# Patient Record
Sex: FEMALE | Race: BLACK OR AFRICAN AMERICAN | Employment: FULL TIME | URBAN - METROPOLITAN AREA
[De-identification: names, ages, dates, MRNs, and addresses within clinical notes are randomized per-mention and may not be internally consistent; named-entity substitution may affect disease eponyms.]

---

## 2020-06-29 ENCOUNTER — APPOINTMENT (OUTPATIENT)
Dept: PHYSICAL THERAPY | Age: 65
End: 2020-06-29

## 2020-06-30 ENCOUNTER — HOSPITAL ENCOUNTER (OUTPATIENT)
Dept: PHYSICAL THERAPY | Age: 65
Discharge: HOME | End: 2020-06-30
Payer: COMMERCIAL

## 2020-06-30 PROCEDURE — 97162 PT EVAL MOD COMPLEX 30 MIN: CPT

## 2020-06-30 PROCEDURE — 97110 THERAPEUTIC EXERCISES: CPT

## 2020-06-30 NOTE — PROGRESS NOTES
PT DAILY TREATMENT NOTE 10-18    Patient Name: Giselle Brito  Date:2020  : 1955  [x]  Patient  Verified  Payor: BLUE CROSS / Plan: [facility] PPO / Product Type: PPO /    In time:8:04  Out time:8:45  Total Treatment Time (min): 41  Visit #: 1 of 8    Medicare/BCBS Only   Total Timed Codes (min):  15 1:1 Treatment Time:  15       Treatment Area: Spinal stenosis, lumbar region without neurogenic claudication [M48.061]    SUBJECTIVE  Pain Level (0-10 scale): 4/10  Any medication changes, allergies to medications, adverse drug reactions, diagnosis change, or new procedure performed?: [x] No    [] Yes (see summary sheet for update)  Subjective functional status/changes:   [] No changes reported  The patient states that she has a chief complaint of low back pain limiting her ease of sleeping as well as performing chores and walking. She does report that she experiences referral into her lower extremities, but mainly at night. OBJECTIVE  26 min [x]Eval                  []Re-Eval       15 min Therapeutic Exercise:  [] See flow sheet :   Rationale: increase ROM and increase strength to improve the patients ability to improve ADL ease. With   [] TE   [] TA   [] neuro   [] other: Patient Education: [x] Review HEP    [] Progressed/Changed HEP based on:   [] positioning   [] body mechanics   [] transfers   [] heat/ice application    [] other:      Other Objective/Functional Measures: See IE     Pain Level (0-10 scale) post treatment: 4/10    ASSESSMENT/Changes in Function: See POC.      Patient will continue to benefit from skilled PT services to modify and progress therapeutic interventions, address functional mobility deficits, address ROM deficits, address strength deficits, analyze and address soft tissue restrictions, analyze and cue movement patterns, analyze and modify body mechanics/ergonomics, assess and modify postural abnormalities and instruct in home and community integration to attain remaining goals. [x]  See Plan of Care  []  See progress note/recertification  []  See Discharge Summary         Progress towards goals / Updated goals:  Short Term Goals: To be accomplished in 2 weeks:               1. The patient will be independent and compliant with HEP to maximize therapeutic benefit. IE: Issued HEP. 2. The patient will demonstrate negative 90/90 hamstring flexibility to test to improve limit lumbar requirements of flexion upon forward flexion   IE: Positive 90/90 test  Long Term Goals: To be accomplished in 4 weeks:               1. The patient will improve FOTO score to 51 to maximize quality of life. IE: 55               2. The patient will demonstrate negative alexander testing/Ely test to reduce stress and lordotic tilt during ambulation. IE: Positive Ely/Alexander test               3. The patient will demonstrate hip ABD/EXT to 4+/5 MMT to maximize ease of chore production. IE: 4/5 MMT glute med/max               4. The patient will report being able to stand for > 30 minutes without increase in low back pain.    IE: reports pain after 15 minutes    PLAN  []  Upgrade activities as tolerated     [x]  Continue plan of care  []  Update interventions per flow sheet       []  Discharge due to:_  []  Other:_      Татьяна Malin, PT 6/30/2020  9:27 AM    Future Appointments   Date Time Provider Janice Cavazos   7/6/2020  8:30 AM Philip Baer, PT [facility]   7/8/2020  7:15 AM Fontanez Pals, PTA [facility]   7/15/2020  7:15 AM Fontanez Pals, PTA [facility]   7/17/2020  7:15 AM Diogo Jimenez, PTA [facility]   7/22/2020  7:15 AM Fontanez Pals, PTA [facility]   7/24/2020  8:00 AM Diogo Jimenez, PTA [facility]   7/28/2020  9:30 AM Barbara Boss, PT [facility]

## 2020-06-30 NOTE — PROGRESS NOTES
[facility]  [address]  [place], [address]  [phone] fax    Plan of Care/ Statement of Necessity for Physical Therapy Services    Patient name: Cee Vega Start of Care: 2020   Referral source: KALEB Mckee : 1955    Medical Diagnosis: Spinal stenosis, lumbar region without neurogenic claudication [M48.061]  Payor: BLUE CROSS / Plan: [facility] PPO / Product Type: PPO /  Onset Date:2019    Treatment Diagnosis: LBP with B LE pain   Prior Hospitalization: see medical history Provider#: [id number]   Medications: Verified on Patient summary List    Comorbidities: HTN, arthritis, BMI > 30   Prior Level of Function: The patient states that she had improved ease of walking, sleeping, and getting up from a chair without significant pain prior to onset. The Plan of Care and following information is based on the information from the initial evaluation. Assessment/ key information: The patient is a 60year old female that has a chief complaint of low back pain and referral into her inner thighs which does extend to her big toes. She reports an onset of 2019 which was insidious and reports that the pain into her legs is not as bad, but happens in short bursts throughout the day or at night, while her back pain remains with her rather constantly aching. She presents with fairly good mobility with exception of extension and rotation. Her strength is rather limited with regards to her core and extensors. She is quite limited regarding flexibility about the hip with positive 90/90 HS testing, Ely, tiffany, and piriformis testing. The patient has impairments related to the diagnosis consisting of pain, decreased ROM, decreased flexibility, decreased strength, decreased ambulation efficiency, and decreased ease of sleeping.  The patient will benefit from skilled PT in order to address the aforementioned impairments. Evaluation Complexity History MEDIUM  Complexity : 1-2 comorbidities / personal factors will impact the outcome/ POC ; Examination MEDIUM Complexity : 3 Standardized tests and measures addressing body structure, function, activity limitation and / or participation in recreation  ;Presentation MEDIUM Complexity : Evolving with changing characteristics  ; Clinical Decision Making MEDIUM Complexity : FOTO score of 26-74  Overall Complexity Rating: MEDIUM  Problem List: pain affecting function, decrease ROM, decrease strength, decrease ADL/ functional abilitiies, decrease activity tolerance and decrease flexibility/ joint mobility   Treatment Plan may include any combination of the following: Therapeutic exercise, Therapeutic activities, Neuromuscular re-education, Physical agent/modality, Manual therapy, Patient education and Functional mobility training  Patient / Family readiness to learn indicated by: asking questions, trying to perform skills and interest  Persons(s) to be included in education: patient (P)  Barriers to Learning/Limitations: None  Patient Goal (s): Issue w/ pain will be resolved (pain in back + legs)  Patient Self Reported Health Status: good  Rehabilitation Potential: good    Short Term Goals: To be accomplished in 2 weeks:   1. The patient will be independent and compliant with HEP to maximize therapeutic benefit. 2. The patient will demonstrate negative 90/90 hamstring flexibility to test to improve limit lumbar requirements of flexion upon forward flexion  Long Term Goals: To be accomplished in 4 weeks:   1. The patient will improve FOTO score to 51 to maximize quality of life. 2. The patient will demonstrate negative tiffany testing/Ely test to reduce stress and lordotic tilt during ambulation. 3. The patient will demonstrate hip ABD/EXT to 4+/5 MMT to maximize ease of chore production.    4. The patient will report being able to stand for > 30 minutes without increase in low back pain. Frequency / Duration: Patient to be seen 2 times per week for 4 weeks. Patient/ Caregiver education and instruction: Diagnosis, prognosis, self care, activity modification and exercises   [x]  Plan of care has been reviewed with JOHNNIE Sevilla, PT 6/30/2020 9:19 AM    ________________________________________________________________________    I certify that the above Therapy Services are being furnished while the patient is under my care. I agree with the treatment plan and certify that this therapy is necessary.     Physician's Signature:____________Date:_________TIME:________    ** Signature, Date and Time must be completed for valid certification **    Please sign and return to [facility] [address]  [address] [facility] [address]  [place], [facility] [address]  [phone] fax

## 2020-07-06 ENCOUNTER — HOSPITAL ENCOUNTER (OUTPATIENT)
Dept: PHYSICAL THERAPY | Age: 65
Discharge: HOME | End: 2020-07-06
Payer: COMMERCIAL

## 2020-07-06 PROCEDURE — 97110 THERAPEUTIC EXERCISES: CPT

## 2020-07-06 PROCEDURE — 97112 NEUROMUSCULAR REEDUCATION: CPT

## 2020-07-06 NOTE — PROGRESS NOTES
PT DAILY TREATMENT NOTE 10-18    Patient Name: Cee Bradshaw  Date:2020  : 1955  [x]  Patient  Verified  Payor: BLUE CROSS / Plan: [facility] PPO / Product Type: PPO /    In CFVC:[id number]  Out time:919  Total Treatment Time (min): 44  Visit #: 2 of 8    Medicare/BCBS Only   Total Timed Codes (min):  44 1:1 Treatment Time:  44       Treatment Area: Spinal stenosis, lumbar region without neurogenic claudication [M48.061]    SUBJECTIVE  Pain Level (0-10 scale): 4  Any medication changes, allergies to medications, adverse drug reactions, diagnosis change, or new procedure performed?: [x] No    [] Yes (see summary sheet for update)  Subjective functional status/changes:   [] No changes reported  The pt reports her back is aching.      OBJECTIVE    Modality rationale:    Min Type Additional Details    [] Estim:  []Unatt       []IFC  []Premod                        []Other:  []w/ice   []w/heat  Position:  Location:    [] Estim: []Att    []TENS instruct  []NMES                    []Other:  []w/US   []w/ice   []w/heat  Position:  Location:    []  Traction: [] Cervical       []Lumbar                       [] Prone          []Supine                       []Intermittent   []Continuous Lbs:  [] before manual  [] after manual    []  Ultrasound: []Continuous   [] Pulsed                           []1MHz   []3MHz W/cm2:  Location:    []  Iontophoresis with dexamethasone         Location: [] Take home patch   [] In clinic    []  Ice     []  heat  []  Ice massage  []  Laser   []  Anodyne Position:  Location:    []  Laser with stim  []  Other:  Position:  Location:    []  Vasopneumatic Device Pressure:       [] lo [] med [] hi   Temperature: [] lo [] med [] hi   [] Skin assessment post-treatment:  []intact []redness- no adverse reaction    []redness  adverse reaction:       26 min Therapeutic Exercise:  [x] See flow sheet :   Rationale: increase ROM and increase strength to improve the patients ability to perform ADL       8 min Neuromuscular Re-education:  [x]  See flow sheet : lumbar stabilization   Rationale: increase strength, improve coordination and increase proprioception  to improve the patients ability to perform functional activities. With   [] TE   [] TA   [] neuro   [] other: Patient Education: [x] Review HEP    [] Progressed/Changed HEP based on:   [] positioning   [] body mechanics   [] transfers   [] heat/ice application    [] other:      Other Objective/Functional Measures: initiated treatment per flow sheet / first f/u     Pain Level (0-10 scale) post treatment: 4    ASSESSMENT/Changes in Function: Good effort noted with treatment. Pt was challenged with 1/2 prone hip extension due c/o weakness. + cues needed at times with therex and for PPT. Patient will continue to benefit from skilled PT services to modify and progress therapeutic interventions, address functional mobility deficits, address ROM deficits, address strength deficits, analyze and address soft tissue restrictions, analyze and cue movement patterns and analyze and modify body mechanics/ergonomics to attain remaining goals. []  See Plan of Care  []  See progress note/recertification  []  See Discharge Summary         Progress towards goals / Updated goals:  Short Term Goals: To be accomplished in 2 weeks:               1. The patient will be independent and compliant with HEP to maximize therapeutic benefit. EVAL: issued HEP   Current: progressing on 7-6-20               2. The patient will demonstrate negative 90/90 hamstring flexibility to test to improve limit lumbar requirements of flexion upon forward flexion  Long Term Goals: To be accomplished in 4 weeks:               1. The patient will improve FOTO score to 51 to maximize quality of life.               2. The patient will demonstrate negative tiffany testing/Ely test to reduce stress and lordotic tilt during ambulation.               3.  The patient will demonstrate hip ABD/EXT to 4+/5 MMT to maximize ease of chore production.               4. The patient will report being able to stand for > 30 minutes without increase in low back pain.     PLAN  []  Upgrade activities as tolerated     [x]  Continue plan of care  []  Update interventions per flow sheet       []  Discharge due to:_  []  Other:_      Dion Hall, PT 7/6/2020  8:39 AM    Future Appointments   Date Time Provider Janice Cavazos   7/8/2020  7:15 AM Rose Piotr, PTA [facility]   7/15/2020  7:15 AM Rose Piotr, PTA [facility]   7/17/2020  7:15 AM Kristeen Brood, PTA [facility]   7/22/2020  7:15 AM Rose Piotr, PTA [facility]   7/24/2020  8:00 AM Kristeen Brood, PTA [facility]   7/28/2020  9:30 AM Bela Boss, PT [facility]

## 2020-07-08 ENCOUNTER — HOSPITAL ENCOUNTER (OUTPATIENT)
Dept: PHYSICAL THERAPY | Age: 65
Discharge: HOME | End: 2020-07-08
Payer: COMMERCIAL

## 2020-07-08 PROCEDURE — 97112 NEUROMUSCULAR REEDUCATION: CPT

## 2020-07-08 PROCEDURE — 97110 THERAPEUTIC EXERCISES: CPT

## 2020-07-08 NOTE — PROGRESS NOTES
PT DAILY TREATMENT NOTE 10-18    Patient Name: Hezzie Aschoff  Date:2020  : 1955  [x]  Patient  Verified  Payor: BLUE CROSS / Plan: [facility] PPO / Product Type: PPO /    In time:7:16  Out time:8:07  Total Treatment Time (min): 46  Visit #: 3 of 8    Medicare/BCBS Only   Total Timed Codes (min):  51 1:1 Treatment Time:  51       Treatment Area: Spinal stenosis, lumbar region without neurogenic claudication [M48.061]    SUBJECTIVE  Pain Level (0-10 scale): 3  Any medication changes, allergies to medications, adverse drug reactions, diagnosis change, or new procedure performed?: [x] No    [] Yes (see summary sheet for update)  Subjective functional status/changes:   [] No changes reported  Pt reports she has been doing her exercises and stretches at home and its helping to keep her pain down and loosen her up. OBJECTIVE    36 min Therapeutic Exercise:  [x] See flow sheet :   Rationale: increase ROM and increase strength to improve the patients ability to perform functional task with ease. 15 min Neuromuscular Re-education:  [x]  See flow sheet :   Rationale: increase strength and improve coordination  to improve the patients ability to tolerate ADL's. With   [] TE   [] TA   [] neuro   [] other: Patient Education: [x] Review HEP    [] Progressed/Changed HEP based on:   [] positioning   [] body mechanics   [] transfers   [] heat/ice application    [] other:      Other Objective/Functional Measures: increased reps to 1/2 prone hip extension to 2x5   1/2 prone DK- 2x5 ea  PPT- 10x3\"  SLR- 10x ea    Pain Level (0-10 scale) post treatment: 3    ASSESSMENT/Changes in Function:   Pt displays improvements with 1/2 prone hip extension this visit performing 2x5 reps with no complaints of increased pain but noting some muscle fatigue in the B knees. Pt had no c/o increased pain with progressed therex and was able to complete the exercises as prescribed.  Pt reports good relief with therex. Patient will continue to benefit from skilled PT services to modify and progress therapeutic interventions, address functional mobility deficits, address ROM deficits, address strength deficits, analyze and address soft tissue restrictions, analyze and cue movement patterns, analyze and modify body mechanics/ergonomics and assess and modify postural abnormalities to attain remaining goals. [x]  See Plan of Care  []  See progress note/recertification  []  See Discharge Summary         Progress towards goals / Updated goals:  Short Term Goals: To be accomplished in 2 weeks:               1. The patient will be independent and compliant with HEP to maximize therapeutic benefit. EVAL: issued HEP              Current: progressing on 7-6-20               2. The patient will demonstrate negative 90/90 hamstring flexibility to test to improve limit lumbar requirements of flexion upon forward flexion   Current: 7/8/20 no change positive 90/90 test  Long Term Goals: To be accomplished in 4 weeks:               1. The patient will improve FOTO score to 51 to maximize quality of life.               2. The patient will demonstrate negative tiffany testing/Ely test to reduce stress and lordotic tilt during ambulation.               3. The patient will demonstrate hip ABD/EXT to 4+/5 MMT to maximize ease of chore production.               4. The patient will report being able to stand for > 30 minutes without increase in low back pain.     PLAN  []  Upgrade activities as tolerated     [x]  Continue plan of care  []  Update interventions per flow sheet       []  Discharge due to:_  []  Other:_      Rola Shaver PTA 7/8/2020  7:02 AM    Future Appointments   Date Time Provider Janice Cavazos   7/8/2020  7:15 AM Huseyin Haley, PTA [facility]   7/15/2020  7:15 AM Huseyin Haley, PTA [facility]   7/17/2020  7:15 AM Diogo Jimenez [facility]   7/22/2020  7:15 AM Huey Boateng C, PTA [facility]   7/24/2020  8:00 AM Horacio Rodrigues, PTA [facility]   7/28/2020  9:30 AM Kevin Boss, PT [facility]

## 2020-07-15 ENCOUNTER — HOSPITAL ENCOUNTER (OUTPATIENT)
Dept: PHYSICAL THERAPY | Age: 65
Discharge: HOME | End: 2020-07-15
Payer: COMMERCIAL

## 2020-07-15 PROCEDURE — 97112 NEUROMUSCULAR REEDUCATION: CPT

## 2020-07-15 PROCEDURE — 97110 THERAPEUTIC EXERCISES: CPT

## 2020-07-15 NOTE — PROGRESS NOTES
PT DAILY TREATMENT NOTE 10-18    Patient Name: Isidra Osoiro  Date:7/15/2020  : 1955  [x]  Patient  Verified  Payor: BLUE CARYL / Plan: [id number] PPO / Product Type: PPO /    In time:7:18  Out time:8:11  Total Treatment Time (min): 48  Visit #: 4 of 8    Medicare/BCBS Only   Total Timed Codes (min):  53 1:1 Treatment Time:  48       Treatment Area: Spinal stenosis, lumbar region without neurogenic claudication [M48.061]    SUBJECTIVE  Pain Level (0-10 scale): 3  Any medication changes, allergies to medications, adverse drug reactions, diagnosis change, or new procedure performed?: [x] No    [] Yes (see summary sheet for update)  Subjective functional status/changes:   [] No changes reported  Pt reports she is still feeling that constant nag in her low back and she feels the pain a lot in her low back when she starts to get tired. OBJECTIVE      38 min Therapeutic Exercise:  [x] See flow sheet :   Rationale: increase ROM and increase strength to improve the patients ability to perform functional task with ease. 15 min Neuromuscular Re-education:  [x]  See flow sheet :    Rationale: increase strength, improve coordination and increase proprioception  to improve the patients ability to perform ADL's with ease. With   [] TE   [] TA   [] neuro   [] other: Patient Education: [x] Review HEP    [] Progressed/Changed HEP based on:   [] positioning   [] body mechanics   [] transfers   [] heat/ice application    [] other:      Other Objective/Functional Measures:   Clamshells- 10x5\"   Assisted adductor stretch- 30\" ea    Pain Level (0-10 scale) post treatment: 1    ASSESSMENT/Changes in Function:   Pt reports she is continuing to get sharp burst of pain in her B inner thighs that keep her up at night and now she is starting to feel the same pain in her B UE's. Clamshells added to continue to strengthen the B hips/glutes to aid with L/S stability.  Pt continues to display fatigue with therex noting increased fatigue in her B knees but was able to perform therex as prescribed. Pt reports decreased pain post treatment. Patient will continue to benefit from skilled PT services to modify and progress therapeutic interventions, address functional mobility deficits, address ROM deficits, address strength deficits, analyze and address soft tissue restrictions, analyze and cue movement patterns, analyze and modify body mechanics/ergonomics and assess and modify postural abnormalities to attain remaining goals. [x]  See Plan of Care  []  See progress note/recertification   []  See Discharge Summary         Progress towards goals / Updated goals:  Short Term Goals: To be accomplished in 2 weeks:               1. The patient will be independent and compliant with HEP to maximize therapeutic benefit.              EVAL: issued HEP              [id number]: progressing on 7-6-20               2. The patient will demonstrate negative 90/90 hamstring flexibility to test to improve limit lumbar requirements of flexion upon forward flexion              Current: 7/8/20 no change positive 90/90 test  Long Term Goals: To be accomplished in 4 weeks:               1. The patient will improve FOTO score to 51 to maximize quality of life.               2. The patient will demonstrate negative tiffany testing/Ely test to reduce stress and lordotic tilt during ambulation.               3. The patient will demonstrate hip ABD/EXT to 4+/5 MMT to maximize ease of chore production.               4. The patient will report being able to stand for > 30 minutes without increase in low back pain.     PLAN  []  Upgrade activities as tolerated     [x]  Continue plan of care  []  Update interventions per flow sheet       []  Discharge due to:_  []  Other:_      Barbara Desai PTA 7/15/2020  7:03 AM    Future Appointments   Date Time Provider Janice Cavazos   7/15/2020  7:15 AM Jimbo Pendleton PTA [facility] 7/17/2020  7:15 AM Dia Quispe, PTA [facility]   7/22/2020  7:15 AM Jimenez Howell, PTA [facility]   7/24/2020  8:00 AM Dia Quispe, PTA [facility]   7/28/2020  9:30 AM Marquez Boss, PT [facility]

## 2020-07-17 ENCOUNTER — HOSPITAL ENCOUNTER (OUTPATIENT)
Dept: PHYSICAL THERAPY | Age: 65
Discharge: HOME | End: 2020-07-17
Payer: COMMERCIAL

## 2020-07-17 PROCEDURE — 97110 THERAPEUTIC EXERCISES: CPT

## 2020-07-17 PROCEDURE — 97112 NEUROMUSCULAR REEDUCATION: CPT

## 2020-07-17 NOTE — PROGRESS NOTES
PT DAILY TREATMENT NOTE 10-18    Patient Name: Rell Solorio  Date:2020  : 1955  [x]  Patient  Verified  Payor: BLUE CROSS / Plan: [facility] PPO / Product Type: PPO /    In time:8:13  Out time:9:07  Total Treatment Time (min): 47  Visit #: 5 of 8    Medicare/BCBS Only   Total Timed Codes (min):  44 1:1 Treatment Time:  44       Treatment Area: Spinal stenosis, lumbar region without neurogenic claudication [M48.061]    SUBJECTIVE  Pain Level (0-10 scale): 3/10  Any medication changes, allergies to medications, adverse drug reactions, diagnosis change, or new procedure performed?: [x] No    [] Yes (see summary sheet for update)  Subjective functional status/changes:   [] No changes reported  The patient states that her left knee and low back are bothersome upon arrival today. The patient continues to report referral into her thighs and legs at [facility]. OBJECTIVE  32 min Therapeutic Exercise:  [x] See flow sheet :   Rationale: increase ROM and increase strength to improve the patients ability to improve ADL ease. 12 min Neuromuscular Re-education:  [x]  See flow sheet :   Rationale: increase ROM and increase strength  to improve the patients ability to improve ADL ease. Modality rationale: decrease pain and increase tissue extensibility to improve the patients ability to improve ADL ease.     Min Type Additional Details   10 []  Ice     [x]  heat  []  Ice massage  []  Laser   []  Anodyne Position: seated  Location: lumbar spine   [] Skin assessment post-treatment:  []intact []redness- no adverse reaction    []redness  adverse reaction:           With   [] TE   [] TA   [] neuro   [] other: Patient Education: [x] Review HEP    [] Progressed/Changed HEP based on:   [] positioning   [] body mechanics   [] transfers   [] heat/ice application    [] other:      Other Objective/Functional Measures:   Alexander/Ely testing bilaterally: Significant improvement in Darlyn Schlatter test. Slight positive. Pain Level (0-10 scale) post treatment: 0/10    ASSESSMENT/Changes in Function: The patient is improving with regards to her anterior flexibility. She demonstrates continued pain regarding her low back and no significant change regarding referral into her legs at night. Good pain control post session, with the patient denying pain upon departure. Patient will continue to benefit from skilled PT services to modify and progress therapeutic interventions, address functional mobility deficits, address ROM deficits, address strength deficits, analyze and address soft tissue restrictions, analyze and cue movement patterns, analyze and modify body mechanics/ergonomics, assess and modify postural abnormalities and instruct in home and community integration to attain remaining goals. []  See Plan of Care  []  See progress note/recertification  []  See Discharge Summary         Progress towards goals / Updated goals:  Short Term Goals: To be accomplished in 2 weeks:               1. The patient will be independent and compliant with HEP to maximize therapeutic benefit.              EVAL: issued HEP              VAKRPRG: progressing on 7-6-20               2. The patient will demonstrate negative 90/90 hamstring flexibility to test to improve limit lumbar requirements of flexion upon forward flexion   IE: positive 90/90              Current: 7/8/20 no change positive 90/90 test  Long Term Goals: To be accomplished in 4 weeks:               1. The patient will improve FOTO score to 51 to maximize quality of life. IE: 55               2. The patient will demonstrate negative tiffany testing/Ely test to reduce stress and lordotic tilt during ambulation. IE: positive tiffany/Ely               3. The patient will demonstrate hip ABD/EXT to 4+/5 MMT to maximize ease of chore production. IE: between 3+ and 4/5 MMT               4.  The patient will report being able to stand for > 30 minutes without increase in low back pain.    IE: 30 minutes    PLAN  []  Upgrade activities as tolerated     [x]  Continue plan of care  []  Update interventions per flow sheet       []  Discharge due to:_  []  Other:_      Chris Renee, CHAPARRO 7/17/2020  8:18 AM    Future Appointments   Date Time Provider Janice Cavazos   7/22/2020  7:15 AM Rose Saldaña PTA [facility]   7/24/2020  8:00 AM [facility]oso [facility]   7/28/2020  9:30 AM Bela Boss, PT [facility]

## 2020-07-22 ENCOUNTER — HOSPITAL ENCOUNTER (OUTPATIENT)
Dept: PHYSICAL THERAPY | Age: 65
Discharge: HOME | End: 2020-07-22
Payer: COMMERCIAL

## 2020-07-22 PROCEDURE — 97112 NEUROMUSCULAR REEDUCATION: CPT

## 2020-07-22 PROCEDURE — 97110 THERAPEUTIC EXERCISES: CPT

## 2020-07-22 NOTE — PROGRESS NOTES
PT DAILY TREATMENT NOTE 10-18    Patient Name: Francoise Lopez  Date:2020  : 1955  [x]  Patient  Verified  Payor: BLUE CROSS / Plan: [id number] PPO / Product Type: PPO /    In time:7:15  Out time:8:15  Total Treatment Time (min): 60  Visit #: 6 of 8    Medicare/BCBS Only   Total Timed Codes (min):  50 1:1 Treatment Time:  50       Treatment Area: Spinal stenosis, lumbar region without neurogenic claudication [M48.061]    SUBJECTIVE  Pain Level (0-10 scale): 3  Any medication changes, allergies to medications, adverse drug reactions, diagnosis change, or new procedure performed?: [x] No    [] Yes (see summary sheet for update)  Subjective functional status/changes:   [] No changes reported  Pt reports her low back and B legs are aching this morning and they have been aching for the past couple of days. OBJECTIVE    Modality rationale: decrease pain and increase tissue extensibility to improve the patients ability to perform functional task with ease.    Min Type Additional Details    [] Estim:  []Unatt       []IFC  []Premod                        []Other:  []w/ice   []w/heat  Position:  Location:    [] Estim: []Att    []TENS instruct  []NMES                    []Other:  []w/US   []w/ice   []w/heat  Position:  Location:    []  Traction: [] Cervical       []Lumbar                       [] Prone          []Supine                       []Intermittent   []Continuous Lbs:  [] before manual  [] after manual    []  Ultrasound: []Continuous   [] Pulsed                           []1MHz   []3MHz W/cm2:  Location:    []  Iontophoresis with dexamethasone         Location: [] Take home patch   [] In clinic   10 []  Ice     [x]  heat  []  Ice massage  []  Laser   []  Anodyne Position: supine  Location: L/S    []  Laser with stim  []  Other:  Position:  Location:    []  Vasopneumatic Device Pressure:       [] lo [] med [] hi   Temperature: [] lo [] med [] hi   [] Skin assessment post-treatment: []intact []redness- no adverse reaction    []redness  adverse reaction:         35 min Therapeutic Exercise:  [x] See flow sheet :   Rationale: increase ROM and increase strength to improve the patients ability to perform functional task with ease. 15 min Neuromuscular Re-education:  [x]  See flow sheet : lucia disc   Rationale: increase strength, improve coordination and increase proprioception  to improve the patients ability to perform ADL's with ease. With   [] TE   [] TA   [] neuro   [] other: Patient Education: [x] Review HEP    [] Progressed/Changed HEP based on:   [] positioning   [] body mechanics   [] transfers   [] heat/ice application    [] other:      Other Objective/Functional Measures:   Mini-squats- 10x     Pain Level (0-10 scale) post treatment: 3    ASSESSMENT/Changes in Function:   Continued pain in the low back and B LE with prolonged sitting with pt reporting good relief following therapy that last about 5 to 6 hours but increased pain after her hour long drive home after work. Mini squats and UE/LE/alt on lucia disc added to strengthen the B glutes and and aid with increased L/S stability. Pt displays improved mobility with therex this visit but notes a continued poor standing and sitting tolerance. Patient will continue to benefit from skilled PT services to modify and progress therapeutic interventions, address functional mobility deficits, address ROM deficits, address strength deficits, analyze and address soft tissue restrictions, analyze and cue movement patterns, analyze and modify body mechanics/ergonomics and assess and modify postural abnormalities to attain remaining goals. [x]  See Plan of Care  []  See progress note/recertification  []  See Discharge Summary         Progress towards goals / Updated goals:  Short Term Goals: To be accomplished in 2 weeks:               1.  The patient will be independent and compliant with HEP to maximize therapeutic benefit.              EVAL: issued HEP              FAPATBC: progressing on 7-6-20               2. The patient will demonstrate negative 90/90 hamstring flexibility to test to improve limit lumbar requirements of flexion upon forward flexion              IE: positive 90/90              Current: 7/8/20 no change positive 90/90 test  Long Term Goals: To be accomplished in 4 weeks:               1. The patient will improve FOTO score to 51 to maximize quality of life. IE: 55               2. The patient will demonstrate negative tiffany testing/Ely test to reduce stress and lordotic tilt during ambulation. IE: positive tiffany/Ely               3. The patient will demonstrate hip ABD/EXT to 4+/5 MMT to maximize ease of chore production. IE: between 3+ and 4/5 MMT               4. The patient will report being able to stand for > 30 minutes without increase in low back pain.               IE: 30 minutes   Current: regressing 7/22/20 Pt reports standing tolerance of 15-20 mins due to increased weakness in the B knees and increased pain in the low back       PLAN  []  Upgrade activities as tolerated     [x]  Continue plan of care  []  Update interventions per flow sheet       []  Discharge due to:_  []  Other:_      Sp Cantrell PTA 7/22/2020  6:54 AM    Future Appointments   Date Time Provider Janice Cavazos   7/22/2020  7:15 AM Alicia MarchJOHNNIE [facility]   7/24/2020  8:00 AM Sylvia Millan PTA [facility]   7/28/2020  9:30 AM Sergio Boss, PT [facility]

## 2020-07-24 ENCOUNTER — HOSPITAL ENCOUNTER (OUTPATIENT)
Dept: PHYSICAL THERAPY | Age: 65
Discharge: HOME | End: 2020-07-24
Payer: COMMERCIAL

## 2020-07-24 PROCEDURE — 97112 NEUROMUSCULAR REEDUCATION: CPT

## 2020-07-24 PROCEDURE — 97110 THERAPEUTIC EXERCISES: CPT

## 2020-07-24 NOTE — PROGRESS NOTES
PT DAILY TREATMENT NOTE 10-18    Patient Name: Kamryn Rivera  Date:2020  : 1955  [x]  Patient  Verified  Payor: BLUE CARYL / Plan: [employer] [id number] PPO / Product Type: PPO /    In time:8:00  Out time:8:58  Total Treatment Time (min): 58  Visit #: 7 of 8    Medicare/BCBS Only   Total Timed Codes (min):  48 1:1 Treatment Time:  48       Treatment Area: Spinal stenosis, lumbar region without neurogenic claudication [M48.061]    SUBJECTIVE  Pain Level (0-10 scale): 3/10  Any medication changes, allergies to medications, adverse drug reactions, diagnosis change, or new procedure performed?: [x] No    [] Yes (see summary sheet for update)  Subjective functional status/changes:   [x] No changes reported    OBJECTIVE    Modality rationale: decrease pain and increase tissue extensibility to improve the patients ability to perform ADL's.    Min Type Additional Details    [] Estim:  []Unatt       []IFC  []Premod                        []Other:  []w/ice   []w/heat  Position:  Location:    [] Estim: []Att    []TENS instruct  []NMES                    []Other:  []w/US   []w/ice   []w/heat  Position:  Location:    []  Traction: [] Cervical       []Lumbar                       [] Prone          []Supine                       []Intermittent   []Continuous Lbs:  [] before manual  [] after manual    []  Ultrasound: []Continuous   [] Pulsed                           []1MHz   []3MHz W/cm2:  Location:    []  Iontophoresis with dexamethasone         Location: [] Take home patch   [] In clinic   10 []  Ice     [x]  heat  []  Ice massage  []  Laser   []  Anodyne Position: Prone  Location: Low back    []  Laser with stim  []  Other:  Position:  Location:    []  Vasopneumatic Device Pressure:       [] lo [] med [] hi   Temperature: [] lo [] med [] hi   [] Skin assessment post-treatment:  []intact []redness- no adverse reaction    []redness  adverse reaction:     38 min Therapeutic Exercise:  [x] See flow sheet : Rationale: increase ROM and increase strength to improve the patients ability to perform ADL's. 10 min Neuromuscular Re-education:  [x]  See flow sheet : Seated core stabs, PPT's with AROM. Rationale: increase strength and increase proprioception  to improve the patients ability to perform functional activities. With   [x] TE   [] TA   [] neuro   [] other: Patient Education: [x] Review HEP    [] Progressed/Changed HEP based on:   [] positioning   [] body mechanics   [] transfers   [] heat/ice application    [] other:      Other Objective/Functional Measures: Cueing to correct mini-squat mechanics. Ely test and [employer] (B) + however demonstrates improved flexibility. Pain Level (0-10 scale) post treatment: 2/10    ASSESSMENT/Changes in Function: Pt fully participated in treatment. Mild limitation in there ex secondary to Left knee pain. Improved hip flexibility, tiffany and ely test's. Continue PT to increase flexibility, increase core and (B) hip strength to improve ease of performing functional activities. Patient will continue to benefit from skilled PT services to modify and progress therapeutic interventions, address functional mobility deficits, address ROM deficits, address strength deficits, analyze and cue movement patterns and analyze and modify body mechanics/ergonomics to attain remaining goals. [x]  See Plan of Care  []  See progress note/recertification  []  See Discharge Summary         Progress towards goals / Updated goals:  Short Term Goals: To be accomplished in 2 weeks:               1. The patient will be independent and compliant with HEP to maximize therapeutic benefit.              EVAL: issued HEP              [id number]: progressing on 7-6-20               2.  The patient will demonstrate negative 90/90 hamstring flexibility to test to improve limit lumbar requirements of flexion upon forward flexion              IE: positive 90/90              Current: 7/8/20 no change positive 90/90 test  Long Term Goals: To be accomplished in 4 weeks:               1. The patient will improve FOTO score to 51 to maximize quality of life.              IE: 46               2. The patient will demonstrate negative alexander testing/Ely test to reduce stress and lordotic tilt during ambulation.              IE: positive alexander/Ely   Current: Progressing, Ely test and Alexander test (B) + however demonstrates improved flexibility. 7/24/2020               3. The patient will demonstrate hip ABD/EXT to 4+/5 MMT to maximize ease of chore production.              IE: between 3+ and 4/5 MMT               4.  The patient will report being able to stand for > 30 minutes without increase in low back pain.              IE: 30 minutes              Current: regressing 7/22/20 Pt reports standing tolerance of 15-20 mins due to increased weakness in the B knees and increased pain in the low back    PLAN  []  Upgrade activities as tolerated     [x]  Continue plan of care  []  Update interventions per flow sheet       []  Discharge due to:_  []  Other:_      Shelton Echols PTA 7/24/2020  8:03 AM    Future Appointments   Date Time Provider Janice Cavazos   7/28/2020  9:30 AM Daquan ZHANG, PT [id number]

## 2020-07-28 ENCOUNTER — HOSPITAL ENCOUNTER (OUTPATIENT)
Dept: PHYSICAL THERAPY | Age: 65
Discharge: HOME | End: 2020-07-28
Payer: COMMERCIAL

## 2020-07-28 PROCEDURE — 97112 NEUROMUSCULAR REEDUCATION: CPT

## 2020-07-28 PROCEDURE — 97110 THERAPEUTIC EXERCISES: CPT

## 2020-07-28 NOTE — PROGRESS NOTES
PT DAILY TREATMENT NOTE 10-18    Patient Name: Hezzie Aschoff  Date:2020  : 1955  [x]  Patient  Verified  Payor: BLUE CROSS / Plan: [facility] PPO / Product Type: PPO /    In time:9:27  Out time:10:20  Total Treatment Time (min): 53  Visit #: 8 of 8    Medicare/BCBS Only   Total Timed Codes (min):  43 1:1 Treatment Time:  43       Treatment Area: Spinal stenosis, lumbar region without neurogenic claudication [M48.061]    SUBJECTIVE  Pain Level (0-10 scale): 2/10  Any medication changes, allergies to medications, adverse drug reactions, diagnosis change, or new procedure performed?: [x] No    [] Yes (see summary sheet for update)  Subjective functional status/changes:   [] No changes reported  The patient states that she can tell a difference with her back and the therapy has really been helping. She indicates she was able to trim bushes and do some yard work over the weekend and lamar lot better than she thought she would. OBJECTIVE  Modality rationale: decrease pain and increase tissue extensibility to improve the patients ability to improve ADL ease. Min Type Additional Details   10 []  Ice     [x]  heat  []  Ice massage  []  Laser   []  Anodyne Position: prone  Location: lumbar spine   [] Skin assessment post-treatment:  []intact []redness- no adverse reaction    []redness  adverse reaction:     27 min Therapeutic Exercise:  [x] See flow sheet :   Rationale: increase ROM and increase strength to improve the patients ability to improve ADL ease. 15 min Neuromuscular Re-education:  [x]  See flow sheet :   Rationale: increase ROM and increase strength  to improve the patients ability to improve ADL ease. With   [] TE   [] TA   [] neuro   [] other: Patient Education: [x] Review HEP    [] Progressed/Changed HEP based on:   [] positioning   [] body mechanics   [] transfers   [] heat/ice application    [] other:      Other Objective/Functional Measures:    FOTO: 47    Strength: Hip ABD: 4/5 MMT  Hip Extension: 4/5 MMT    Able to stand for greater amounts of time, but shy of 30' at this time. Pain Level (0-10 scale) post treatment: 0/10    ASSESSMENT/Changes in Function: The patient has made good progress, stating she is able to stand for longer amounts of time, as well as noting ability to perform yard work with greater ease. Her flexibility is improving, but does still remain positive regarding 90/90, Ely, and tiffany testing. Her hip strength has 4/5 MMT, and her FOTO score has 47 indicating a slight improvement in quality of life. The patient will continue to benefit from PT for emphasis of strengthening progression as tolerated to improve ease of standing and housework. Patient will continue to benefit from skilled PT services to modify and progress therapeutic interventions, address functional mobility deficits, address ROM deficits, address strength deficits, analyze and address soft tissue restrictions, analyze and cue movement patterns, analyze and modify body mechanics/ergonomics, assess and modify postural abnormalities and instruct in home and community integration to attain remaining goals. []  See Plan of Care  []  See progress note/recertification  []  See Discharge Summary         Progress towards goals / Updated goals:  Short Term Goals: To be accomplished in 2 weeks:               1. The patient will be independent and compliant with HEP to maximize therapeutic benefit.              EVAL: issued HEP              [id number]: progressing on 7-6-20               2. The patient will demonstrate negative 90/90 hamstring flexibility to test to improve limit lumbar requirements of flexion upon forward flexion              IE: positive 90/90              Current: 7/8/20 no change positive 90/90 test  Long Term Goals: To be accomplished in 4 weeks:               1.  The patient will improve FOTO score to 51 to maximize quality of life.              IE: 46   Current: 47               2. The patient will demonstrate negative alexander testing/Ely test to reduce stress and lordotic tilt during ambulation.              IE: positive alexander/Ely              Current: Progressing, Ely test and Alexander test (B) + however demonstrates improved flexibility. 7/24/2020               3. The patient will demonstrate hip ABD/EXT to 4+/5 MMT to maximize ease of chore production.              IE: between 3+ and 4/5 MMT               4. The patient will report being able to stand for > 30 minutes without increase in low back pain.              IE: 30 minutes              Current: The patient reports that she is able to stand for less than 30 minutes at a time, but has improved, noting ability to perform yard-work over the weekend easier 7/28/2020    PLAN  []  Upgrade activities as tolerated     [x]  Continue plan of care  []  Update interventions per flow sheet       []  Discharge due to:_  []  Other:_      Ari Berkowitz, PT 7/28/2020  9:39 AM    No future appointments.

## 2020-07-28 NOTE — PROGRESS NOTES
[facility]  [address]  [place], [address]  [phone] fax    Physical Therapy Progress Note  Patient name: Dmitry Montejo Start of Care: 2020   Referral source: Robbanastasiia RiveraKALEB castañeda : 1955                Medical Diagnosis: Spinal stenosis, lumbar region without neurogenic claudication [M48.061]  Payor: BLUE CROSS / Plan: [facility] PPO / Product Type: PPO /  Onset Date:2019                Treatment Diagnosis: LBP with B LE pain   Prior Hospitalization: see medical history Provider#: [id number]   Medications: Verified on Patient summary List    Comorbidities: HTN, arthritis, BMI > 30   Prior Level of Function: The patient states that she had improved ease of walking, sleeping, and getting up from a chair without significant pain prior to onset. Visits from Start of Care: 8    Missed Visits: 0    Key Functional Changes: FOTO: 47     Strength: Hip ABD: 4/5 MMT  Hip Extension: 4/5 MMT     Able to stand for greater amounts of time, but shy of 30' at this time.       Short Term Goals: To be accomplished in 2 weeks:               1. The patient will be independent and compliant with HEP to maximize therapeutic benefit.              EVAL: issued HEP              BMQARBQ: progressing on 20               2. The patient will demonstrate negative 90/90 hamstring flexibility to test to improve limit lumbar requirements of flexion upon forward flexion              IE: positive 90/90              Current: 20 no change positive 90/90 test  Long Term Goals: To be accomplished in 4 weeks:               1. The patient will improve FOTO score to 51 to maximize quality of life.              IE: 46              Current: 47               2.  The patient will demonstrate negative tiffany testing/Ely test to reduce stress and lordotic tilt during ambulation.              IE: positive tiffany/Ely              Current: Progressing, Ely test and [employer] (B) + however demonstrates improved flexibility. 7/24/2020               3. The patient will demonstrate hip ABD/EXT to 4+/5 MMT to maximize ease of chore production.              IE: between 3+ and 4/5 MMT               4. The patient will report being able to stand for > 30 minutes without increase in low back pain.              IE: 30 minutes              Current: The patient reports that she is able to stand for less than 30 minutes at a time, but has improved, noting ability to perform yard-work over the weekend easier 7/28/2020    Updated Goals: to be achieved in 2-3 weeks:    1. The patient will improve FOTO score to 51 to maximize quality of life.              IE: 46              Current: 47               2. The patient will demonstrate negative alexander testing/Ely test to reduce stress and lordotic tilt during ambulation.              IE: positive alexander/Ely              Current: Progressing, Ely test and Alexander test (B) + however demonstrates improved flexibility. 7/24/2020               3. The patient will demonstrate hip ABD/EXT to 4+/5 MMT to maximize ease of chore production.              IE: between 3+ and 4/5 MMT               4. The patient will report being able to stand for > 30 minutes without increase in low back pain.              IE: 30 minutes              Current: The patient reports that she is able to stand for less than 30 minutes at a time, but has improved, noting ability to perform yard-work over the weekend easier 7/28/2020    ASSESSMENT/RECOMMENDATIONS: The patient has made good progress, stating she is able to stand for longer amounts of time, as well as noting ability to perform yard work with greater ease. Her flexibility is improving, but does still remain positive regarding 90/90, Ely, and alexander testing. Her hip strength has 4/5 MMT, and her FOTO score has 47 indicating a slight improvement in quality of life.  The patient will continue to benefit from PT for emphasis of strengthening progression as tolerated to improve ease of standing and housework. [x]Continue therapy per initial plan/protocol at a frequency of  2 x per week for 2-3 weeks  []Continue therapy with the following recommended changes:_____________________      _____________________________________________________________________  []Discontinue therapy progressing towards or have reached established goals  []Discontinue therapy due to lack of appreciable progress towards goals  []Discontinue therapy due to lack of attendance or compliance  []Await Physician's recommendations/decisions regarding therapy  []Other:________________________________________________________________    Thank you for this referral.    Jassi Hernandez, PT 7/28/2020 9:59 AM  NOTE TO PHYSICIAN:  PLEASE COMPLETE THE ORDERS BELOW AND   FAX TO [facility]: [phone]  If you are unable to process this request in 24 hours please contact our office: [phone]    ? I have read the above report and request that my patient continue as recommended. ? I have read the above report and request that my patient continue therapy with the following changes/special instructions:__________________________________________________________  ? I have read the above report and request that my patient be discharged from therapy.     Physicians signature: ______________________________Date: ______Time:______

## 2020-08-03 ENCOUNTER — HOSPITAL ENCOUNTER (OUTPATIENT)
Dept: PHYSICAL THERAPY | Age: 65
Discharge: HOME | End: 2020-08-03
Payer: COMMERCIAL

## 2020-08-03 PROCEDURE — 97112 NEUROMUSCULAR REEDUCATION: CPT

## 2020-08-03 PROCEDURE — 97110 THERAPEUTIC EXERCISES: CPT

## 2020-08-03 NOTE — PROGRESS NOTES
PT DAILY TREATMENT NOTE 10-18    Patient Name: Scott Rivera  Date:8/3/2020  : 1955  [x]  Patient  Verified  Payor: BLUE CROSS / Plan: [facility] PPO / Product Type: PPO /    In time:12:46  Out time:1:30  Total Treatment Time (min): 44  Visit #: 1 of 4-6    Medicare/BCBS Only   Total Timed Codes (min):  34 1:1 Treatment Time:  34       Treatment Area: Spinal stenosis, lumbar region without neurogenic claudication [M48.061]    SUBJECTIVE  Pain Level (0-10 scale): 4/10  Any medication changes, allergies to medications, adverse drug reactions, diagnosis change, or new procedure performed?: [x] No    [] Yes (see summary sheet for update)  Subjective functional status/changes:   [] No changes reported  \"A little more sore today, probably from cleaning the house this weekend. \"    OBJECTIVE    Modality rationale: decrease pain and increase tissue extensibility to improve the patients ability to perform ADL's.    Min Type Additional Details    [] Estim:  []Unatt       []IFC  []Premod                        []Other:  []w/ice   []w/heat  Position:  Location:    [] Estim: []Att    []TENS instruct  []NMES                    []Other:  []w/US   []w/ice   []w/heat  Position:  Location:    []  Traction: [] Cervical       []Lumbar                       [] Prone          []Supine                       []Intermittent   []Continuous Lbs:  [] before manual  [] after manual    []  Ultrasound: []Continuous   [] Pulsed                           []1MHz   []3MHz W/cm2:  Location:    []  Iontophoresis with dexamethasone         Location: [] Take home patch   [] In clinic   10 []  Ice     [x]  heat  []  Ice massage  []  Laser   []  Anodyne Position: Prone  Location: low back    []  Laser with stim  []  Other:  Position:  Location:    []  Vasopneumatic Device Pressure:       [] lo [] med [] hi   Temperature: [] lo [] med [] hi   [] Skin assessment post-treatment:  []intact []redness- no adverse reaction []redness  adverse reaction:     22 min Therapeutic Exercise:  [x] See flow sheet :   Rationale: increase ROM and increase strength to improve the patients ability to perform ADL's.    12 min Neuromuscular Re-education:  [x]  See flow sheet : Mini-squats, lucia disc series for core stabs. Rationale: increase strength and increase proprioception  to improve the patients ability to perform functional activities. With   [x] TE   [] TA   [] neuro   [] other: Patient Education: [x] Review HEP    [] Progressed/Changed HEP based on:   [] positioning   [] body mechanics   [] transfers   [] heat/ice application    [] other:      Other Objective/Functional Measures:      Pain Level (0-10 scale) post treatment: 0/10    ASSESSMENT/Changes in Function: Continues to required extensive cueing to correct sit to stand/mini-squat form, limited hip flexion ROM. Pt reports being partially limited due to (B) knee pain. Pt fully participated in treatment and left in no apparent distress. Patient will continue to benefit from skilled PT services to modify and progress therapeutic interventions, address functional mobility deficits, address ROM deficits, address strength deficits, analyze and cue movement patterns and analyze and modify body mechanics/ergonomics to attain remaining goals. [x]  See Plan of Care  []  See progress note/recertification  []  See Discharge Summary         Progress towards goals / Updated goals:  Updated Goals: to be achieved in 2-3 weeks:               1. The patient will improve FOTO score to 51 to maximize quality of life.              IE: 46              Current: 47               2. The patient will demonstrate negative alexander testing/Ely test to reduce stress and lordotic tilt during ambulation.              IE: positive alexander/Ely              Current: Progressing, Ely test and Alexander test (B) + however demonstrates improved flexibility. 7/24/2020               3.  The patient will demonstrate hip ABD/EXT to 4+/5 MMT to maximize ease of chore production.              IE: between 3+ and 4/5 MMT               4.  The patient will report being able to stand for > 30 minutes without increase in low back pain.              IE: 30 minutes              Current: The patient reports that she is able to stand for less than 30 minutes at a time, but has improved, noting ability to perform yard-work over the weekend easier 7/28/2020    PLAN  []  Upgrade activities as tolerated     [x]  Continue plan of care  []  Update interventions per flow sheet       []  Discharge due to:_  []  Other:_      Rocio Roque, JOHNNIE 8/3/2020  12:48 PM    Future Appointments   Date Time Provider Janice Cavazos   8/7/2020  7:00 AM Sajan Harrington, JOHNNIE [facility]   8/12/2020  8:30 AM Tomekaida Nguyễn, PT [facility]   8/14/2020  7:00 AM Fidel Lai, PTA [facility]   8/18/2020  9:00 AM Tomekaida Nguyễn, PT [facility]   8/20/2020  9:00 AM Fidel Lai, PTA [facility]

## 2020-08-07 ENCOUNTER — HOSPITAL ENCOUNTER (OUTPATIENT)
Dept: PHYSICAL THERAPY | Age: 65
Discharge: HOME | End: 2020-08-07
Payer: COMMERCIAL

## 2020-08-07 PROCEDURE — 97110 THERAPEUTIC EXERCISES: CPT

## 2020-08-07 PROCEDURE — 97112 NEUROMUSCULAR REEDUCATION: CPT

## 2020-08-07 NOTE — PROGRESS NOTES
PT DAILY TREATMENT NOTE 10-18    Patient Name: Gina Harrison  Date:2020  : 1955  [x]  Patient  Verified  Payor: BLUE CROSS / Plan: [facility] PPO / Product Type: PPO /    In time:7:00  Out time:7:50  Total Treatment Time (min): 50  Visit #: 2 of 6    Medicare/BCBS Only   Total Timed Codes (min):  40 1:1 Treatment Time:  40       Treatment Area: Spinal stenosis, lumbar region without neurogenic claudication [M48.061]    SUBJECTIVE  Pain Level (0-10 scale): 3/10  Any medication changes, allergies to medications, adverse drug reactions, diagnosis change, or new procedure performed?: [x] No    [] Yes (see summary sheet for update)  Subjective functional status/changes:   [] No changes reported  \"I have pain every morning. \"    OBJECTIVE    Modality rationale: decrease pain and increase tissue extensibility to improve the patients ability to tolerate ADLs.     Min Type Additional Details    [] Estim:  []Unatt       []IFC  []Premod                        []Other:  []w/ice   []w/heat  Position:  Location:    [] Estim: []Att    []TENS instruct  []NMES                    []Other:  []w/US   []w/ice   []w/heat  Position:  Location:    []  Traction: [] Cervical       []Lumbar                       [] Prone          []Supine                       []Intermittent   []Continuous Lbs:  [] before manual  [] after manual    []  Ultrasound: []Continuous   [] Pulsed                           []1MHz   []3MHz W/cm2:  Location:    []  Iontophoresis with dexamethasone         Location: [] Take home patch   [] In clinic   10 []  Ice     [x]  heat  []  Ice massage  []  Laser   []  Anodyne Position:prone  Location:low back     []  Laser with stim  []  Other:  Position:  Location:    []  Vasopneumatic Device Pressure:       [] lo [] med [] hi   Temperature: [] lo [] med [] hi   [] Skin assessment post-treatment:  []intact []redness- no adverse reaction    []redness  adverse reaction:       30 min Therapeutic Exercise:  [x] See flow sheet :   Rationale: increase ROM and increase strength to improve the patients ability to tolerate ADLs. 10 min Neuromuscular Re-education:  [x]  See flow sheet :   Rationale: increase strength and improve coordination  to improve the patients ability to improve tolerance to ADLs. With   [] TE   [] TA   [] neuro   [] other: Patient Education: [x] Review HEP    [] Progressed/Changed HEP based on:   [] positioning   [] body mechanics   [] transfers   [] heat/ice application    [] other:      Other Objective/Functional Measures: Pt demonstrates good form with sit to stands without UE support. Pain Level (0-10 scale) post treatment: 0/10    ASSESSMENT/Changes in Function: Pt demonstrates good ability to engage core but requires vc's due to fatigue. Patient will continue to benefit from skilled PT services to modify and progress therapeutic interventions, address functional mobility deficits, address ROM deficits, address strength deficits, analyze and address soft tissue restrictions, analyze and cue movement patterns and analyze and modify body mechanics/ergonomics to attain remaining goals. []  See Plan of Care  []  See progress note/recertification  []  See Discharge Summary         Progress towards goals / Updated goals:  Updated Goals: to be achieved in 2-3 weeks:               1. The patient will improve FOTO score to 51 to maximize quality of life.              IE: 46              Current: 47               2. The patient will demonstrate negative alexander testing/Ely test to reduce stress and lordotic tilt during ambulation.              IE: positive alexander/Ely              Current: Progressing, Ely test and Alexander test (B) + however demonstrates improved flexibility. 7/24/2020               3. The patient will demonstrate hip ABD/EXT to 4+/5 MMT to maximize ease of chore production.              IE: between 3+ and 4/5 MMT               4.  The patient will report being able to stand for > 30 minutes without increase in low back pain.              IE: 30 minutes              Current: The patient reports that she is able to stand for less than 30 minutes at a time, but has improved, noting ability to perform yard-work over the weekend easier 7/28/2020       PLAN  []  Upgrade activities as tolerated     [x]  Continue plan of care  []  Update interventions per flow sheet       []  Discharge due to:_  []  Other:_      Joel Barrientos PTA 8/7/2020  7:07 AM    Future Appointments   Date Time Provider Janice Cavazos   8/12/2020  8:30 AM Rhiannon Boss, PT [facility]   8/14/2020  7:00 AM Cristina Aj, PTA [facility]   8/18/2020  9:00 AM Cherise Min, PT [facility]   8/20/2020  9:00 AM Cristina Aj, PTA [facility]

## 2020-08-12 ENCOUNTER — APPOINTMENT (OUTPATIENT)
Dept: PHYSICAL THERAPY | Age: 65
End: 2020-08-12
Payer: COMMERCIAL

## 2020-08-14 ENCOUNTER — APPOINTMENT (OUTPATIENT)
Dept: PHYSICAL THERAPY | Age: 65
End: 2020-08-14
Payer: COMMERCIAL

## 2020-08-18 ENCOUNTER — APPOINTMENT (OUTPATIENT)
Dept: PHYSICAL THERAPY | Age: 65
End: 2020-08-18
Payer: COMMERCIAL

## 2020-08-20 ENCOUNTER — APPOINTMENT (OUTPATIENT)
Dept: PHYSICAL THERAPY | Age: 65
End: 2020-08-20
Payer: COMMERCIAL

## 2024-08-14 DIAGNOSIS — I10 ESSENTIAL HYPERTENSION: Primary | ICD-10-CM

## 2024-08-14 NOTE — TELEPHONE ENCOUNTER
Patient is requesting refill on      atenolol (TENORMIN) 50 MG tablet [[id number]]    Order Details  Dose: 50 mg Route: Oral Frequency: DAILY   Dispense Quantity: -- Refills: --          Sig: Take 50 mg by mouth daily       Future Appointments   Date Time Provider Department Center   8/22/2024  2:45 PM Tereza Pinto MD [facility]

## 2024-08-15 RX ORDER — ATENOLOL 50 MG/1
50 TABLET ORAL DAILY
Qty: 90 TABLET | Refills: 0 | Status: SHIPPED | OUTPATIENT
Start: 2024-08-15

## 2024-08-15 NOTE — TELEPHONE ENCOUNTER
Orders Placed This Encounter    atenolol (TENORMIN) 50 MG tablet     Sig: Take 1 tablet by mouth daily     Dispense:  90 tablet     Refill:  0

## 2024-08-22 ENCOUNTER — OFFICE VISIT (OUTPATIENT)
Facility: CLINIC | Age: 69
End: 2024-08-22
Payer: COMMERCIAL

## 2024-08-22 VITALS
OXYGEN SATURATION: 96 % | RESPIRATION RATE: 16 BRPM | TEMPERATURE: 96.8 F | DIASTOLIC BLOOD PRESSURE: 77 MMHG | SYSTOLIC BLOOD PRESSURE: 124 MMHG | HEART RATE: 71 BPM | HEIGHT: 65 IN | WEIGHT: 249.4 LBS | BODY MASS INDEX: 41.55 KG/M2

## 2024-08-22 DIAGNOSIS — I10 ESSENTIAL HYPERTENSION: Primary | ICD-10-CM

## 2024-08-22 DIAGNOSIS — R73.02 IMPAIRED GLUCOSE TOLERANCE: ICD-10-CM

## 2024-08-22 DIAGNOSIS — E66.01 MORBID OBESITY (HCC): ICD-10-CM

## 2024-08-22 DIAGNOSIS — E78.1 HYPERTRIGLYCERIDEMIA: ICD-10-CM

## 2024-08-22 PROCEDURE — 3074F SYST BP LT 130 MM HG: CPT | Performed by: FAMILY MEDICINE

## 2024-08-22 PROCEDURE — 1123F ACP DISCUSS/DSCN MKR DOCD: CPT | Performed by: FAMILY MEDICINE

## 2024-08-22 PROCEDURE — 3078F DIAST BP <80 MM HG: CPT | Performed by: FAMILY MEDICINE

## 2024-08-22 PROCEDURE — 99214 OFFICE O/P EST MOD 30 MIN: CPT | Performed by: FAMILY MEDICINE

## 2024-08-22 RX ORDER — CLONIDINE HYDROCHLORIDE 0.1 MG/1
1 TABLET ORAL DAILY
COMMUNITY
End: 2024-08-22 | Stop reason: SDUPTHER

## 2024-08-22 RX ORDER — OLMESARTAN MEDOXOMIL AND HYDROCHLOROTHIAZIDE 40/25 40; 25 MG/1; MG/1
1 TABLET ORAL DAILY
Qty: 90 TABLET | Refills: 1 | Status: SHIPPED | OUTPATIENT
Start: 2024-08-22

## 2024-08-22 RX ORDER — CLONIDINE HYDROCHLORIDE 0.1 MG/1
0.1 TABLET ORAL DAILY
Qty: 180 TABLET | Refills: 1 | Status: SHIPPED | OUTPATIENT
Start: 2024-08-22

## 2024-08-22 RX ORDER — OLMESARTAN MEDOXOMIL AND HYDROCHLOROTHIAZIDE 40/25 40; 25 MG/1; MG/1
1 TABLET ORAL DAILY
COMMUNITY
End: 2024-08-22 | Stop reason: SDUPTHER

## 2024-08-22 SDOH — ECONOMIC STABILITY: FOOD INSECURITY: WITHIN THE PAST 12 MONTHS, THE FOOD YOU BOUGHT JUST DIDN'T LAST AND YOU DIDN'T HAVE MONEY TO GET MORE.: NEVER TRUE

## 2024-08-22 SDOH — ECONOMIC STABILITY: FOOD INSECURITY: WITHIN THE PAST 12 MONTHS, YOU WORRIED THAT YOUR FOOD WOULD RUN OUT BEFORE YOU GOT MONEY TO BUY MORE.: NEVER TRUE

## 2024-08-22 SDOH — ECONOMIC STABILITY: INCOME INSECURITY: HOW HARD IS IT FOR YOU TO PAY FOR THE VERY BASICS LIKE FOOD, HOUSING, MEDICAL CARE, AND HEATING?: NOT HARD AT ALL

## 2024-08-22 ASSESSMENT — PATIENT HEALTH QUESTIONNAIRE - PHQ9
SUM OF ALL RESPONSES TO PHQ QUESTIONS 1-9: 0
SUM OF ALL RESPONSES TO PHQ9 QUESTIONS 1 & 2: 0
1. LITTLE INTEREST OR PLEASURE IN DOING THINGS: NOT AT ALL
SUM OF ALL RESPONSES TO PHQ QUESTIONS 1-9: 0
2. FEELING DOWN, DEPRESSED OR HOPELESS: NOT AT ALL
SUM OF ALL RESPONSES TO PHQ QUESTIONS 1-9: 0
SUM OF ALL RESPONSES TO PHQ QUESTIONS 1-9: 0

## 2024-08-22 NOTE — PROGRESS NOTES
\"Have you been to the ER, urgent care clinic since your last visit?  Hospitalized since your last visit?\"    NO    “Have you seen or consulted any other health care providers outside of [facility] since your last visit?”    NO        “Have you had a colorectal cancer screening such as a colonoscopy/FIT/Cologuard?    YES - Type: Colonoscopy - Where: [facility] to request most recent records if not in the chart     No colonoscopy on file  No cologuard on file  No FIT/FOBT on file   No flexible sigmoidoscopy on file         Click Here for Release of Records Request   
S/p rejuvenex injections in both knees.   She is considering PRP treatment.     She notes that her pain is less with walking.    She had not been able to exercise as a result of her pain.                Health maintenance:  -Colonoscopy-patient up-to-date.  Needs repeat colonoscopy in 2028.  -Mammogram- UTD (12/16/23) - Normal          -Impaired glucose tolerance- patient's last A1c was 5.8 in December 2023.  Not on any medications.  Tingling sensation in toes now.  Used to have neuropathy in her legs.   Has a prior hx of slipped disc in the Lumbar region.      She had a tear in L-6  (Patient has an extra lumbar vertebrae).  She gets significant back pain off and on.     She notes that vacuuming can be difficult for her to complete d/t her back pain.                    Hypertension-patient blood pressure is noted to be well-controlled.  She is currently taking atenolol 50 mg daily, and olmesartan 40 mg-HCTZ 25 mg daily and clonidine 0.1 mg daily.  She denies any chest pain, headaches, dizziness and swelling.      Past Medical History:   Diagnosis Date    Chronic kidney disease     stone    GERD (gastroesophageal reflux disease)     Hypertension     Kidney stone     Pneumonia      Past Surgical History:   Procedure Laterality Date    BREAST BIOPSY      BREAST SURGERY      bx    CHOLECYSTECTOMY      GYN      hyst    HEENT      HYSTERECTOMY (CERVIX STATUS UNKNOWN)      NH UNLISTED PROCEDURE ABDOMEN PERITONEUM & OMENTUM      gall bladder     Current Outpatient Medications   Medication Sig    fenofibrate (TRICOR) 145 MG tablet Take 1 tablet by mouth daily    cloNIDine (CATAPRES) 0.1 MG tablet Take 1 tablet by mouth daily    olmesartan-hydroCHLOROthiazide (BENICAR HCT) 40-25 MG per tablet Take 1 tablet by mouth daily    atenolol (TENORMIN) 50 MG tablet Take 1 tablet by mouth daily    potassium chloride (KLOR-CON M) 20 MEQ extended release tablet Take 1 tablet by mouth daily     No current facility-administered medications

## 2024-08-23 LAB
A/G RATIO: 1.7 RATIO (ref 1.1–2.6)
ALBUMIN: 4.5 G/DL (ref 3.5–5)
ALP BLD-CCNC: 89 U/L (ref 40–120)
ALT SERPL-CCNC: 18 U/L (ref 5–40)
ANION GAP SERPL CALCULATED.3IONS-SCNC: 18 MMOL/L (ref 3–15)
AST SERPL-CCNC: 19 U/L (ref 10–37)
BILIRUB SERPL-MCNC: 0.3 MG/DL (ref 0.2–1.2)
BUN BLDV-MCNC: 15 MG/DL (ref 6–22)
CALCIUM SERPL-MCNC: 9.7 MG/DL (ref 8.4–10.5)
CHLORIDE BLD-SCNC: 98 MMOL/L (ref 98–110)
CHOLESTEROL, TOTAL: 190 MG/DL (ref 110–200)
CHOLESTEROL/HDL RATIO: 4.1 (ref 0–5)
CO2: 26 MMOL/L (ref 20–32)
CREAT SERPL-MCNC: 0.8 MG/DL (ref 0.8–1.4)
CREATININE URINE: 100 MG/DL
ESTIMATED AVERAGE GLUCOSE: 118 MG/DL (ref 91–123)
GFR, ESTIMATED: >60 ML/MIN/1.73 SQ.M.
GLOBULIN: 2.6 G/DL (ref 2–4)
GLUCOSE: 56 MG/DL (ref 70–99)
HBA1C MFR BLD: 5.7 % (ref 4.8–5.6)
HDLC SERPL-MCNC: 46 MG/DL
LDL CHOLESTEROL: 73 MG/DL (ref 50–99)
LDL/HDL RATIO: 1.6
MICROALB/CREAT RATIO (UG/MG CREAT.): 43.5 (ref 0–30)
MICROALBUMIN/CREAT 24H UR: 43.5 MG/L (ref 0.1–17)
NON-HDL CHOLESTEROL: 144 MG/DL
POTASSIUM SERPL-SCNC: 3.5 MMOL/L (ref 3.5–5.5)
SODIUM BLD-SCNC: 142 MMOL/L (ref 133–145)
TOTAL PROTEIN: 7.1 G/DL (ref 6.2–8.1)
TRIGL SERPL-MCNC: 353 MG/DL (ref 40–149)
VLDLC SERPL CALC-MCNC: 71 MG/DL (ref 8–30)

## 2024-09-04 RX ORDER — FENOFIBRATE 145 MG/1
145 TABLET, COATED ORAL DAILY
Qty: 30 TABLET | Refills: 3 | Status: SHIPPED | OUTPATIENT
Start: 2024-09-04

## 2024-11-05 DIAGNOSIS — I10 ESSENTIAL HYPERTENSION: ICD-10-CM

## 2024-11-05 NOTE — TELEPHONE ENCOUNTER
Patient is requesting refill on    atenolol (TENORMIN) 50 MG tablet [[id number]]    Order Details  Dose: 50 mg Route: Oral Frequency: DAILY   Dispense Quantity: 90 tablet Refills: 0          Sig: Take 1 tablet by mouth daily       No future appointments.

## 2024-11-06 RX ORDER — ATENOLOL 50 MG/1
50 TABLET ORAL DAILY
Qty: 90 TABLET | Refills: 1 | Status: SHIPPED | OUTPATIENT
Start: 2024-11-06

## 2025-02-11 SDOH — ECONOMIC STABILITY: FOOD INSECURITY: WITHIN THE PAST 12 MONTHS, THE FOOD YOU BOUGHT JUST DIDN'T LAST AND YOU DIDN'T HAVE MONEY TO GET MORE.: NEVER TRUE

## 2025-02-11 SDOH — ECONOMIC STABILITY: TRANSPORTATION INSECURITY
IN THE PAST 12 MONTHS, HAS LACK OF TRANSPORTATION KEPT YOU FROM MEETINGS, WORK, OR FROM GETTING THINGS NEEDED FOR DAILY LIVING?: NO

## 2025-02-11 SDOH — ECONOMIC STABILITY: FOOD INSECURITY: WITHIN THE PAST 12 MONTHS, YOU WORRIED THAT YOUR FOOD WOULD RUN OUT BEFORE YOU GOT MONEY TO BUY MORE.: NEVER TRUE

## 2025-02-11 SDOH — ECONOMIC STABILITY: INCOME INSECURITY: IN THE LAST 12 MONTHS, WAS THERE A TIME WHEN YOU WERE NOT ABLE TO PAY THE MORTGAGE OR RENT ON TIME?: NO

## 2025-02-11 SDOH — ECONOMIC STABILITY: TRANSPORTATION INSECURITY
IN THE PAST 12 MONTHS, HAS THE LACK OF TRANSPORTATION KEPT YOU FROM MEDICAL APPOINTMENTS OR FROM GETTING MEDICATIONS?: NO

## 2025-02-12 ENCOUNTER — HOSPITAL ENCOUNTER (OUTPATIENT)
Facility: HOSPITAL | Age: 70
Setting detail: SPECIMEN
Discharge: HOME | End: 2025-02-15

## 2025-02-12 ENCOUNTER — OFFICE VISIT (OUTPATIENT)
Facility: CLINIC | Age: 70
End: 2025-02-12

## 2025-02-12 VITALS
OXYGEN SATURATION: 94 % | HEART RATE: 71 BPM | HEIGHT: 65 IN | BODY MASS INDEX: 41.19 KG/M2 | WEIGHT: 247.2 LBS | RESPIRATION RATE: 17 BRPM | DIASTOLIC BLOOD PRESSURE: 83 MMHG | TEMPERATURE: 97.1 F | SYSTOLIC BLOOD PRESSURE: 129 MMHG

## 2025-02-12 DIAGNOSIS — K21.9 GASTROESOPHAGEAL REFLUX DISEASE, UNSPECIFIED WHETHER ESOPHAGITIS PRESENT: ICD-10-CM

## 2025-02-12 DIAGNOSIS — E78.1 HYPERTRIGLYCERIDEMIA: ICD-10-CM

## 2025-02-12 DIAGNOSIS — I10 ESSENTIAL HYPERTENSION: ICD-10-CM

## 2025-02-12 DIAGNOSIS — R73.02 IMPAIRED GLUCOSE TOLERANCE: Primary | ICD-10-CM

## 2025-02-12 DIAGNOSIS — E66.01 MORBID OBESITY WITH BODY MASS INDEX (BMI) OF 40.0 TO 44.9 IN ADULT: ICD-10-CM

## 2025-02-12 DIAGNOSIS — Z76.0 MEDICATION REFILL: ICD-10-CM

## 2025-02-12 LAB
ESTIMATED AVERAGE GLUCOSE: 115 MG/DL (ref 91–123)
HBA1C MFR BLD: 5.6 % (ref 4.8–5.6)

## 2025-02-12 PROCEDURE — 99001 SPECIMEN HANDLING PT-LAB: CPT

## 2025-02-12 RX ORDER — CLONIDINE HYDROCHLORIDE 0.1 MG/1
0.1 TABLET ORAL DAILY
Qty: 180 TABLET | Refills: 1 | Status: SHIPPED | OUTPATIENT
Start: 2025-02-12

## 2025-02-12 RX ORDER — POTASSIUM CHLORIDE 1500 MG/1
20 TABLET, EXTENDED RELEASE ORAL DAILY
Qty: 90 TABLET | Refills: 1 | Status: SHIPPED | OUTPATIENT
Start: 2025-02-12

## 2025-02-12 RX ORDER — OLMESARTAN MEDOXOMIL AND HYDROCHLOROTHIAZIDE 40/25 40; 25 MG/1; MG/1
1 TABLET ORAL DAILY
Qty: 90 TABLET | Refills: 1 | Status: SHIPPED | OUTPATIENT
Start: 2025-02-12

## 2025-02-12 RX ORDER — VITAMIN B COMPLEX
1 CAPSULE ORAL DAILY
COMMUNITY

## 2025-02-12 RX ORDER — ATENOLOL 50 MG/1
50 TABLET ORAL DAILY
Qty: 90 TABLET | Refills: 1 | Status: SHIPPED | OUTPATIENT
Start: 2025-02-12

## 2025-02-12 ASSESSMENT — ENCOUNTER SYMPTOMS
BLOOD IN STOOL: 0
ABDOMINAL PAIN: 0
VOMITING: 0
COUGH: 0
NAUSEA: 0
SHORTNESS OF BREATH: 0

## 2025-02-12 ASSESSMENT — PATIENT HEALTH QUESTIONNAIRE - PHQ9
1. LITTLE INTEREST OR PLEASURE IN DOING THINGS: NOT AT ALL
SUM OF ALL RESPONSES TO PHQ QUESTIONS 1-9: 0
SUM OF ALL RESPONSES TO PHQ QUESTIONS 1-9: 0
SUM OF ALL RESPONSES TO PHQ9 QUESTIONS 1 & 2: 0
SUM OF ALL RESPONSES TO PHQ QUESTIONS 1-9: 0
2. FEELING DOWN, DEPRESSED OR HOPELESS: NOT AT ALL
SUM OF ALL RESPONSES TO PHQ QUESTIONS 1-9: 0

## 2025-02-12 NOTE — ASSESSMENT & PLAN NOTE
Well-controlled, continue current medications and continue current treatment plan  -Taking clonidine 0.1 mg daily  -On olmesartan -HCTZ 40-25 mg once daily  -On atenolol 50 mg once daily  Orders:   REFILL  atenolol (TENORMIN) 50 MG tablet; Take 1 tablet by mouth daily    cloNIDine (CATAPRES) 0.1 MG tablet; Take 1 tablet by mouth daily   REFILL olmesartan-hydroCHLOROthiazide (BENICAR HCT) 40-25 MG per tablet; Take 1 tablet by mouth daily    Lipid Panel; Future    Comprehensive Metabolic Panel; Future

## 2025-02-12 NOTE — ASSESSMENT & PLAN NOTE
(8/22/24) Last A1c 5.7   Well-controlled, continue current medications and continue current treatment plan   Orders:    Hemoglobin A1C; Future

## 2025-02-12 NOTE — ASSESSMENT & PLAN NOTE
-Recommend a low calorie diet  -Patient encouraged to exercise for 30 minutes 3 to 5 times a week.  -Recommend eating a well balanced healthy diet. (Consistent of lean meats, lots of fruits, vegetables and whole grains).    -Limit processed foods.   -Drink 32 to 64 ounces of fluid each day  -Eat 2 to 3 g of fiber each day

## 2025-02-12 NOTE — PROGRESS NOTES
\"Have you been to the ER, urgent care clinic since your last visit?  Hospitalized since your last visit?\"    NO    “Have you seen or consulted any other health care providers outside our system since your last visit?”    NO      “Have you had a colorectal cancer screening such as a colonoscopy/FIT/Cologuard?    YES - Type: Colonoscopy - Where: Patient doesn't remember  Nurse/CMA to request most recent records if not in the chart     No colonoscopy on file  No cologuard on file  No FIT/FOBT on file   No flexible sigmoidoscopy on file

## 2025-02-12 NOTE — PROGRESS NOTES
Ai Teran (:  1955) is a 69 y.o. female,Established patient, here for evaluation of the following chief complaint(s):  Follow-up, Hypertension, and Weight Management         Assessment & Plan  Essential hypertension  Well-controlled, continue current medications and continue current treatment plan  -Taking clonidine 0.1 mg daily  -On olmesartan -HCTZ 40-25 mg once daily  -On atenolol 50 mg once daily  Orders:   REFILL  atenolol (TENORMIN) 50 MG tablet; Take 1 tablet by mouth daily    cloNIDine (CATAPRES) 0.1 MG tablet; Take 1 tablet by mouth daily   REFILL olmesartan-hydroCHLOROthiazide (BENICAR HCT) 40-25 MG per tablet; Take 1 tablet by mouth daily    Lipid Panel; Future    Comprehensive Metabolic Panel; Future    Impaired glucose tolerance  (24) Last A1c 5.7   Well-controlled, continue current medications and continue current treatment plan   Orders:    Hemoglobin A1C; Future    Hypertriglyceridemia  -Ran out of fenofibrate 145mg aday x 1 mopnth.  -Recheck lipids   -Will restart medication today.     (24) , LDL chol 73, T chol 190.   Orders:    Lipid Panel; Future    Comprehensive Metabolic Panel; Future    Morbid obesity with body mass index (BMI) of 40.0 to 44.9 in adult  -Recommend a low calorie diet  -Patient encouraged to exercise for 30 minutes 3 to 5 times a week.  -Recommend eating a well balanced healthy diet. (Consistent of lean meats, lots of fruits, vegetables and whole grains).    -Limit processed foods.   -Drink 32 to 64 ounces of fluid each day  -Eat 2 to 3 g of fiber each day         Gastroesophageal reflux disease, unspecified whether esophagitis present  Orders:   REFILL omeprazole (PRILOSEC) 20 MG delayed release capsule; Take 1 capsule by mouth every morning (before breakfast)    Medication refill  Orders:   REFILL potassium chloride (KLOR-CON M) 20 MEQ extended release tablet; Take 1 tablet by mouth daily      Return in about 6 months (around 2025)

## 2025-02-13 LAB
A/G RATIO: 1.7 RATIO (ref 1.1–2.6)
ALBUMIN: 4.5 G/DL (ref 3.5–5)
ALP BLD-CCNC: 91 U/L (ref 40–120)
ALT SERPL-CCNC: 15 U/L (ref 5–40)
ANION GAP SERPL CALCULATED.3IONS-SCNC: 14 MMOL/L (ref 3–15)
AST SERPL-CCNC: 18 U/L (ref 10–37)
BILIRUB SERPL-MCNC: 0.2 MG/DL (ref 0.2–1.2)
BUN BLDV-MCNC: 20 MG/DL (ref 6–22)
CALCIUM SERPL-MCNC: 10.6 MG/DL (ref 8.4–10.5)
CHLORIDE BLD-SCNC: 104 MMOL/L (ref 98–110)
CHOLESTEROL, TOTAL: 192 MG/DL (ref 110–200)
CHOLESTEROL/HDL RATIO: 3.5 (ref 0–5)
CO2: 28 MMOL/L (ref 20–32)
CREAT SERPL-MCNC: 0.9 MG/DL (ref 0.8–1.4)
GFR, ESTIMATED: >60 ML/MIN/1.73 SQ.M.
GLOBULIN: 2.7 G/DL (ref 2–4)
GLUCOSE: 103 MG/DL (ref 70–99)
HDLC SERPL-MCNC: 55 MG/DL
LDL CHOLESTEROL: 103 MG/DL (ref 50–99)
LDL/HDL RATIO: 1.9
NON-HDL CHOLESTEROL: 137 MG/DL
POTASSIUM SERPL-SCNC: 4.2 MMOL/L (ref 3.5–5.5)
SENTARA SPECIMEN COLLECTION: NORMAL
SODIUM BLD-SCNC: 146 MMOL/L (ref 133–145)
TOTAL PROTEIN: 7.2 G/DL (ref 6.2–8.1)
TRIGL SERPL-MCNC: 170 MG/DL (ref 40–149)
VLDLC SERPL CALC-MCNC: 34 MG/DL (ref 8–30)

## 2025-02-21 ENCOUNTER — TELEPHONE (OUTPATIENT)
Facility: CLINIC | Age: 70
End: 2025-02-21

## 2025-02-21 DIAGNOSIS — E78.1 HYPERTRIGLYCERIDEMIA: ICD-10-CM

## 2025-02-21 RX ORDER — FENOFIBRATE 145 MG/1
145 TABLET, COATED ORAL DAILY
Qty: 90 TABLET | Refills: 1 | Status: SHIPPED | OUTPATIENT
Start: 2025-02-21

## 2025-02-21 NOTE — TELEPHONE ENCOUNTER
Ms. Teran is requesting refills of:    Requested Prescriptions     fenofibrate (TRICOR) 145 MG tablet     to be sent to   [facility]/pharmacy #[id number] - [place], [place] - [address] - P [phone] - F [phone]  [address]  ([place])  [facility] [address]  Phone: [phone] Fax: [phone]  .     LAST OFFICE VISIT:  2/12/2025     UPCOMING APPOINTMENT(S):  Future Appointments   Date Time Provider Department Center   8/12/2025 10:15 AM Tereza Pinto MD [facility]       Patient offered an appointment? N/A  How much medication does the patient have on hand?0    Provided notified

## 2025-02-21 NOTE — TELEPHONE ENCOUNTER
Last Appointment:  2/12/2025  Future Appointments   Date Time Provider Department Center   8/12/2025 10:15 AM Tereza Pinto MD [facility]

## 2025-02-22 NOTE — TELEPHONE ENCOUNTER
Orders Placed This Encounter    fenofibrate (TRICOR) 145 MG tablet     Sig: Take 1 tablet by mouth daily     Dispense:  90 tablet     Refill:  1

## 2025-07-28 DIAGNOSIS — Z76.0 MEDICATION REFILL: ICD-10-CM

## 2025-07-29 RX ORDER — POTASSIUM CHLORIDE 1500 MG/1
20 TABLET, EXTENDED RELEASE ORAL DAILY
Qty: 90 TABLET | Refills: 1 | Status: SHIPPED | OUTPATIENT
Start: 2025-07-29

## 2025-07-29 NOTE — TELEPHONE ENCOUNTER
Orders Placed This Encounter    potassium chloride (KLOR-CON M) 20 MEQ extended release tablet     Sig: Take 1 tablet by mouth daily     Dispense:  90 tablet     Refill:  1

## 2025-08-12 ENCOUNTER — OFFICE VISIT (OUTPATIENT)
Facility: CLINIC | Age: 70
End: 2025-08-12
Payer: COMMERCIAL

## 2025-08-12 VITALS
DIASTOLIC BLOOD PRESSURE: 84 MMHG | HEIGHT: 65 IN | HEART RATE: 62 BPM | SYSTOLIC BLOOD PRESSURE: 129 MMHG | BODY MASS INDEX: 41.88 KG/M2 | OXYGEN SATURATION: 95 % | RESPIRATION RATE: 17 BRPM | WEIGHT: 251.4 LBS | TEMPERATURE: 96.8 F

## 2025-08-12 DIAGNOSIS — R73.02 IMPAIRED GLUCOSE TOLERANCE: Primary | ICD-10-CM

## 2025-08-12 DIAGNOSIS — E66.01 MORBID OBESITY WITH BODY MASS INDEX (BMI) OF 40.0 TO 44.9 IN ADULT (HCC): ICD-10-CM

## 2025-08-12 DIAGNOSIS — K21.9 GASTROESOPHAGEAL REFLUX DISEASE, UNSPECIFIED WHETHER ESOPHAGITIS PRESENT: ICD-10-CM

## 2025-08-12 DIAGNOSIS — R07.9 CHEST PAIN, UNSPECIFIED TYPE: ICD-10-CM

## 2025-08-12 DIAGNOSIS — E78.1 HYPERTRIGLYCERIDEMIA: ICD-10-CM

## 2025-08-12 DIAGNOSIS — R00.1 BRADYCARDIA: ICD-10-CM

## 2025-08-12 DIAGNOSIS — Z76.0 MEDICATION REFILL: ICD-10-CM

## 2025-08-12 DIAGNOSIS — I10 ESSENTIAL HYPERTENSION: ICD-10-CM

## 2025-08-12 LAB
ESTIMATED AVERAGE GLUCOSE: 119 MG/DL (ref 91–123)
HBA1C MFR BLD: 5.8 % (ref 4.8–5.6)

## 2025-08-12 PROCEDURE — 99214 OFFICE O/P EST MOD 30 MIN: CPT | Performed by: FAMILY MEDICINE

## 2025-08-12 PROCEDURE — 1123F ACP DISCUSS/DSCN MKR DOCD: CPT | Performed by: FAMILY MEDICINE

## 2025-08-12 PROCEDURE — 3079F DIAST BP 80-89 MM HG: CPT | Performed by: FAMILY MEDICINE

## 2025-08-12 PROCEDURE — 3074F SYST BP LT 130 MM HG: CPT | Performed by: FAMILY MEDICINE

## 2025-08-12 RX ORDER — CLONIDINE HYDROCHLORIDE 0.1 MG/1
0.1 TABLET ORAL DAILY
Qty: 180 TABLET | Refills: 1 | Status: SHIPPED | OUTPATIENT
Start: 2025-08-12

## 2025-08-12 RX ORDER — FENOFIBRATE 145 MG/1
145 TABLET, FILM COATED ORAL DAILY
Qty: 90 TABLET | Refills: 1 | Status: CANCELLED | OUTPATIENT
Start: 2025-08-12

## 2025-08-12 RX ORDER — FENOFIBRATE 145 MG/1
145 TABLET, FILM COATED ORAL DAILY
Qty: 90 TABLET | Refills: 1 | Status: SHIPPED | OUTPATIENT
Start: 2025-08-12

## 2025-08-12 RX ORDER — ATENOLOL 50 MG/1
50 TABLET ORAL DAILY
Qty: 90 TABLET | Refills: 1 | Status: SHIPPED | OUTPATIENT
Start: 2025-08-12

## 2025-08-12 RX ORDER — POTASSIUM CHLORIDE 1500 MG/1
20 TABLET, EXTENDED RELEASE ORAL DAILY
Qty: 90 TABLET | Refills: 1 | Status: SHIPPED | OUTPATIENT
Start: 2025-08-12

## 2025-08-12 SDOH — ECONOMIC STABILITY: FOOD INSECURITY: WITHIN THE PAST 12 MONTHS, THE FOOD YOU BOUGHT JUST DIDN'T LAST AND YOU DIDN'T HAVE MONEY TO GET MORE.: NEVER TRUE

## 2025-08-12 SDOH — ECONOMIC STABILITY: FOOD INSECURITY: WITHIN THE PAST 12 MONTHS, YOU WORRIED THAT YOUR FOOD WOULD RUN OUT BEFORE YOU GOT MONEY TO BUY MORE.: NEVER TRUE

## 2025-08-12 ASSESSMENT — PATIENT HEALTH QUESTIONNAIRE - PHQ9
SUM OF ALL RESPONSES TO PHQ QUESTIONS 1-9: 0
1. LITTLE INTEREST OR PLEASURE IN DOING THINGS: NOT AT ALL
2. FEELING DOWN, DEPRESSED OR HOPELESS: NOT AT ALL
SUM OF ALL RESPONSES TO PHQ QUESTIONS 1-9: 0

## 2025-08-13 ENCOUNTER — HOSPITAL ENCOUNTER (OUTPATIENT)
Facility: HOSPITAL | Age: 70
Setting detail: SPECIMEN
Discharge: HOME | End: 2025-08-16

## 2025-08-13 LAB
A/G RATIO: 1.6 RATIO (ref 1.1–2.6)
ALBUMIN: 4.3 G/DL (ref 3.5–5)
ALP BLD-CCNC: 60 U/L (ref 40–120)
ALT SERPL-CCNC: 15 U/L (ref 5–40)
ANION GAP SERPL CALCULATED.3IONS-SCNC: 14 MMOL/L (ref 3–15)
AST SERPL-CCNC: 17 U/L (ref 10–37)
BILIRUB SERPL-MCNC: 0.3 MG/DL (ref 0.2–1.2)
BUN BLDV-MCNC: 18 MG/DL (ref 6–22)
CALCIUM SERPL-MCNC: 10.4 MG/DL (ref 8.4–10.5)
CHLORIDE BLD-SCNC: 104 MMOL/L (ref 98–110)
CHOLESTEROL, TOTAL: 172 MG/DL (ref 110–200)
CHOLESTEROL/HDL RATIO: 3.2 (ref 0–5)
CO2: 23 MMOL/L (ref 20–32)
CREAT SERPL-MCNC: 0.9 MG/DL (ref 0.8–1.4)
GFR, ESTIMATED: 65.9 ML/MIN/1.73 SQ.M.
GLOBULIN: 2.7 G/DL (ref 2–4)
GLUCOSE: 103 MG/DL (ref 70–99)
HDLC SERPL-MCNC: 54 MG/DL
LDL CHOLESTEROL: 93 MG/DL (ref 50–99)
LDL/HDL RATIO: 1.7
NON-HDL CHOLESTEROL: 118 MG/DL
POTASSIUM SERPL-SCNC: 4 MMOL/L (ref 3.5–5.5)
SENTARA SPECIMEN COLLECTION: NORMAL
SODIUM BLD-SCNC: 141 MMOL/L (ref 133–145)
TOTAL PROTEIN: 7 G/DL (ref 6.2–8.1)
TRIGL SERPL-MCNC: 125 MG/DL (ref 40–149)
VLDLC SERPL CALC-MCNC: 25 MG/DL (ref 8–30)

## 2025-08-13 PROCEDURE — 99001 SPECIMEN HANDLING PT-LAB: CPT

## 2025-08-13 RX ORDER — OLMESARTAN MEDOXOMIL AND HYDROCHLOROTHIAZIDE 40/25 40; 25 MG/1; MG/1
1 TABLET ORAL DAILY
Qty: 90 TABLET | Refills: 1 | Status: SHIPPED | OUTPATIENT
Start: 2025-08-13

## 2025-08-13 RX ORDER — OMEPRAZOLE 20 MG/1
20 CAPSULE, DELAYED RELEASE ORAL
Qty: 90 CAPSULE | Refills: 1 | Status: SHIPPED | OUTPATIENT
Start: 2025-08-13

## 2025-08-23 ENCOUNTER — PATIENT MESSAGE (OUTPATIENT)
Facility: CLINIC | Age: 70
End: 2025-08-23